# Patient Record
Sex: MALE | Race: WHITE | NOT HISPANIC OR LATINO | URBAN - METROPOLITAN AREA
[De-identification: names, ages, dates, MRNs, and addresses within clinical notes are randomized per-mention and may not be internally consistent; named-entity substitution may affect disease eponyms.]

---

## 2017-10-05 VITALS
HEART RATE: 78 BPM | HEIGHT: 68 IN | RESPIRATION RATE: 18 BRPM | TEMPERATURE: 97 F | DIASTOLIC BLOOD PRESSURE: 78 MMHG | SYSTOLIC BLOOD PRESSURE: 143 MMHG | WEIGHT: 205.03 LBS | OXYGEN SATURATION: 95 %

## 2017-10-05 NOTE — H&P ADULT - ASSESSMENT
77 y.o Male with PMHx of HTN, hyperlipidemia, NIDDM, Known CAD with prior PCIs, most recent ABBEY mRCA @  [facility] on 03/23/07  with patent prior mid and OM1 stent , 40% prox RCA, and 60% RPDA who presents today for recommended Cardiac Cath with possible intervention if clinically indicated secondary to CCS Anginal Class 3 Equivalent  symptoms in the setting of an abnormal Stress test.      ASA: III and Mallampati: III  ***OF NOTE:   Pt took her daily dose of ASA 325mg PO X 1 and Plavix 75mg PO X 1@ home today. 77 y.o Male with PMHx of HTN, hyperlipidemia, NIDDM, Known CAD with prior PCIs, most recent ABBEY mRCA @  [facility] on 03/23/07  with patent prior mid and OM1 stent , 40% prox RCA, and 60% RPDA who presents today for recommended Cardiac Cath with possible intervention if clinically indicated secondary to CCS Anginal Class 3 Equivalent  symptoms in the setting of an abnormal Stress test.      ASA: III and Mallampati: III  ***OF NOTE:   Pt received ASA 325mg PO X 1 and Plavix 75mg PO X 1 prior to his procedure today.

## 2017-10-05 NOTE — H&P ADULT - HISTORY OF PRESENT ILLNESS
77 y.o Male with PMHx of HTN, hyperlipidemia, NIDDM, who presented to his cardiologist c/o       Denies CP,  SOB, palpitations, dizziness, syncope, recent PND/orthopnea, LE edema, or decrease in exercise tolerance.        In light of pt's risk factors, above CCS Anginal Class _____ symptoms, and an abnormal Stress test, pt is now referred to [facility] for recommended Cardiac Cath with possible intervention if clinically indicated to  r/o suspected underlying CAD. 77 y.o Male with PMHx of HTN, hyperlipidemia, NIDDM, Known CAD with prior PCIs, most recent PCI @ [facility]  ~12yrs ago,   who presented to his cardiologist c/o       Denies CP,  SOB, palpitations, dizziness, syncope, recent PND/orthopnea, LE edema, or decrease in exercise tolerance.    Coronary CTA done on 11/18/16 revealed a total calcium score of         In light of pt's risk factors, above CCS Anginal Class _____ symptoms, and an abnormal Stress test, pt is now referred to [facility] for recommended Cardiac Cath with possible intervention if clinically indicated to  r/o suspected underlying CAD. 77 y.o Male with PMHx of HTN, hyperlipidemia, NIDDM, Known CAD with prior PCIs, most recent PCI @ [facility]  ~12yrs ago,   who presented to his cardiologist c/o   DO    Denies CP,  SOB, palpitations, dizziness, syncope, recent PND/orthopnea, LE edema, or decrease in exercise tolerance.    Coronary CTA done on 11/18/16 revealed a total calcium score of         In light of pt's risk factors, above CCS Anginal Class _____ symptoms, and an abnormal Stress test, pt is now referred to [facility] for recommended Cardiac Cath with possible intervention if clinically indicated to  r/o suspected underlying CAD. 77 y.o Male with PMHx of HTN, hyperlipidemia, NIDDM, Known CAD with prior PCIs, most recent PCI @ [facility]  ~12yrs ago,   who presented to his cardiologist Dr. Israel Gibson reporting a recent decline in his  over all ET.  Pt states he would consider himself to be a pretty active torin and first noted  he was feeling very "sluggish" and would easily getting SOB  while playing tennis.  He further states he can barely walk more than one city block or climb more than one  flight of stairs before he is lethargic and SOB.  He states he often finds the need to stop and "catch his breath". He denies experiencing any recent PND/orthopnea, LE edema, palpitations, dizziness, or  syncope.  He denies having any recent cardiac imaging studies done this year.  Coronary CTA done 11/18/16 revealed possible ISR of LAD and LCx/OM1 stents.  Mild stenosis of the LM secondary to non-calcific and calcific plaque.  Patent stent in the proximal and mid RCA and D2.  Limited evaluation of the mid RCA stent due to artifact, although probably patent.  Mild stenosis of the proximal and distal RCA.   Extra cardiac findings include a 9mm pleural- based nodule seen.  LVEF of 70%.   Further assessment with formal CT chest was recommended.      In light of pt's risk factors, Known CAD, above CCS Anginal Class 3 Equivalent symptoms, and an abnormal  Coronary CTA, pt is now referred to [facility] for recommended Cardiac Cath with possible intervention if clinically indicated to  r/o suspected CAD progression. 77 y.o Male with PMHx of HTN, hyperlipidemia, NIDDM, Known CAD with prior PCIs, most recent ABBEY mRCA # [facility] on 03/23/07  with patent prior mid and OM1 stent , 40% prox RCA, and 60% RPDA who presented to his cardiologist Dr. Israel Gibson reporting a recent decline in his  over all ET.  Pt states he would consider himself to be a pretty active torin and first noted  he was feeling very "sluggish" and would easily getting SOB  while playing tennis.  He further states he can barely walk more than one city block or climb more than one  flight of stairs before he is lethargic and SOB.  He states he often finds the need to stop and "catch his breath". He denies experiencing any recent PND/orthopnea, LE edema, palpitations, dizziness, or  syncope.  He denies having any recent cardiac imaging studies done this year.  Coronary CTA done 11/18/16 revealed possible ISR of LAD and LCx/OM1 stents.  Mild stenosis of the LM secondary to non-calcific and calcific plaque.  Patent stent in the proximal and mid RCA and D2.  Limited evaluation of the mid RCA stent due to artifact, although probably patent.  Mild stenosis of the proximal and distal RCA.   Extra cardiac findings include a 9mm pleural- based nodule seen.  LVEF of 70%.   Further assessment with formal CT chest was recommended.      In light of pt's risk factors, Known CAD, above CCS Anginal Class 3 Equivalent symptoms, and an abnormal  Coronary CTA, pt is now referred to [facility] for recommended Cardiac Cath with possible intervention if clinically indicated to  r/o suspected CAD progression. 77 y.o Male with PMHx of HTN, hyperlipidemia, NIDDM, Known CAD with prior PCIs, most recent ABBEY mRCA @  [facility] on 03/23/07  with patent prior mid and OM1 stent , 40% prox RCA, and 60% RPDA who presented to his cardiologist Dr. Israel Gibson reporting a recent decline in his  over all ET.  Pt states he would consider himself to be a pretty active torin and first noted  he was feeling very "sluggish" and would easily getting SOB  while playing tennis.  He further states he can barely walk more than one city block or climb more than one  flight of stairs before he is lethargic and SOB.  He states he often finds the need to stop and "catch his breath". He denies experiencing any recent PND/orthopnea, LE edema, palpitations, dizziness, or  syncope.  He denies having any recent cardiac imaging studies done this year.  Coronary CTA done 11/18/16 revealed possible ISR of LAD and LCx/OM1 stents.  Mild stenosis of the LM secondary to non-calcific and calcific plaque.  Patent stent in the proximal and mid RCA and D2.  Limited evaluation of the mid RCA stent due to artifact, although probably patent.  Mild stenosis of the proximal and distal RCA.   Extra cardiac findings include a 9mm pleural- based nodule seen.  LVEF of 70%.   Further assessment with formal CT chest was recommended.      In light of pt's risk factors, Known CAD, above CCS Anginal Class 3 Equivalent symptoms, and an abnormal  Coronary CTA, pt is now referred to [facility] for recommended Cardiac Cath with possible intervention if clinically indicated to  r/o suspected CAD progression.

## 2017-10-05 NOTE — H&P ADULT - PMH
Diabetes mellitus    Hyperlipidemia    Hypertension Coronary artery disease    Diabetes mellitus    Hyperlipidemia    Hypertension

## 2017-10-06 ENCOUNTER — INPATIENT (INPATIENT)
Facility: HOSPITAL | Age: 77
LOS: 0 days | Discharge: HOME | DRG: 247 | End: 2017-10-07
Attending: INTERNAL MEDICINE | Admitting: INTERNAL MEDICINE
Payer: MEDICARE

## 2017-10-06 DIAGNOSIS — Z90.49 ACQUIRED ABSENCE OF OTHER SPECIFIED PARTS OF DIGESTIVE TRACT: Chronic | ICD-10-CM

## 2017-10-06 LAB
ALBUMIN SERPL ELPH-MCNC: 4.7 G/DL — SIGNIFICANT CHANGE UP (ref 3.3–5)
ALP SERPL-CCNC: 57 U/L — SIGNIFICANT CHANGE UP (ref 40–120)
ALT FLD-CCNC: 18 U/L — SIGNIFICANT CHANGE UP (ref 10–45)
ANION GAP SERPL CALC-SCNC: 16 MMOL/L — SIGNIFICANT CHANGE UP (ref 5–17)
APTT BLD: 24.3 SEC — LOW (ref 27.5–37.4)
AST SERPL-CCNC: 17 U/L — SIGNIFICANT CHANGE UP (ref 10–40)
BASOPHILS NFR BLD AUTO: 1 % — SIGNIFICANT CHANGE UP (ref 0–2)
BILIRUB SERPL-MCNC: 0.6 MG/DL — SIGNIFICANT CHANGE UP (ref 0.2–1.2)
BUN SERPL-MCNC: 21 MG/DL — SIGNIFICANT CHANGE UP (ref 7–23)
CALCIUM SERPL-MCNC: 10.2 MG/DL — SIGNIFICANT CHANGE UP (ref 8.4–10.5)
CHLORIDE SERPL-SCNC: 101 MMOL/L — SIGNIFICANT CHANGE UP (ref 96–108)
CHOLEST SERPL-MCNC: 190 MG/DL — SIGNIFICANT CHANGE UP (ref 10–199)
CK MB CFR SERPL CALC: 2.7 NG/ML — SIGNIFICANT CHANGE UP (ref 0–6.7)
CO2 SERPL-SCNC: 24 MMOL/L — SIGNIFICANT CHANGE UP (ref 22–31)
CREAT SERPL-MCNC: 1.08 MG/DL — SIGNIFICANT CHANGE UP (ref 0.5–1.3)
CRP SERPL-MCNC: 0.6 MG/DL — HIGH (ref 0–0.4)
EOSINOPHIL NFR BLD AUTO: 3.7 % — SIGNIFICANT CHANGE UP (ref 0–6)
GLUCOSE SERPL-MCNC: 220 MG/DL — HIGH (ref 70–99)
HBA1C BLD-MCNC: 7.3 % — HIGH (ref 4–5.6)
HCT VFR BLD CALC: 40.5 % — SIGNIFICANT CHANGE UP (ref 39–50)
HDLC SERPL-MCNC: 61 MG/DL — SIGNIFICANT CHANGE UP (ref 40–125)
HGB BLD-MCNC: 13.7 G/DL — SIGNIFICANT CHANGE UP (ref 13–17)
INR BLD: 0.91 — SIGNIFICANT CHANGE UP (ref 0.88–1.16)
LIPID PNL WITH DIRECT LDL SERPL: 107 MG/DL — SIGNIFICANT CHANGE UP
LYMPHOCYTES # BLD AUTO: 27.5 % — SIGNIFICANT CHANGE UP (ref 13–44)
MCHC RBC-ENTMCNC: 29.1 PG — SIGNIFICANT CHANGE UP (ref 27–34)
MCHC RBC-ENTMCNC: 33.8 G/DL — SIGNIFICANT CHANGE UP (ref 32–36)
MCV RBC AUTO: 86 FL — SIGNIFICANT CHANGE UP (ref 80–100)
MONOCYTES NFR BLD AUTO: 6.9 % — SIGNIFICANT CHANGE UP (ref 2–14)
NEUTROPHILS NFR BLD AUTO: 60.9 % — SIGNIFICANT CHANGE UP (ref 43–77)
PLATELET # BLD AUTO: 166 K/UL — SIGNIFICANT CHANGE UP (ref 150–400)
POTASSIUM SERPL-MCNC: 4 MMOL/L — SIGNIFICANT CHANGE UP (ref 3.5–5.3)
POTASSIUM SERPL-SCNC: 4 MMOL/L — SIGNIFICANT CHANGE UP (ref 3.5–5.3)
PROT SERPL-MCNC: 7.7 G/DL — SIGNIFICANT CHANGE UP (ref 6–8.3)
PROTHROM AB SERPL-ACNC: 10.1 SEC — SIGNIFICANT CHANGE UP (ref 9.8–12.7)
RBC # BLD: 4.71 M/UL — SIGNIFICANT CHANGE UP (ref 4.2–5.8)
RBC # FLD: 13.8 % — SIGNIFICANT CHANGE UP (ref 10.3–16.9)
SODIUM SERPL-SCNC: 141 MMOL/L — SIGNIFICANT CHANGE UP (ref 135–145)
TOTAL CHOLESTEROL/HDL RATIO MEASUREMENT: 3.1 RATIO — LOW (ref 3.4–9.6)
TRIGL SERPL-MCNC: 110 MG/DL — SIGNIFICANT CHANGE UP (ref 10–149)
WBC # BLD: 8.2 K/UL — SIGNIFICANT CHANGE UP (ref 3.8–10.5)
WBC # FLD AUTO: 8.2 K/UL — SIGNIFICANT CHANGE UP (ref 3.8–10.5)

## 2017-10-06 PROCEDURE — 92928 PRQ TCAT PLMT NTRAC ST 1 LES: CPT | Mod: RC

## 2017-10-06 PROCEDURE — 93458 L HRT ARTERY/VENTRICLE ANGIO: CPT | Mod: 26,XU

## 2017-10-06 PROCEDURE — 93010 ELECTROCARDIOGRAM REPORT: CPT

## 2017-10-06 RX ORDER — SODIUM CHLORIDE 9 MG/ML
1000 INJECTION, SOLUTION INTRAVENOUS
Qty: 0 | Refills: 0 | Status: DISCONTINUED | OUTPATIENT
Start: 2017-10-06 | End: 2017-10-07

## 2017-10-06 RX ORDER — SODIUM CHLORIDE 9 MG/ML
500 INJECTION INTRAMUSCULAR; INTRAVENOUS; SUBCUTANEOUS
Qty: 0 | Refills: 0 | Status: DISCONTINUED | OUTPATIENT
Start: 2017-10-06 | End: 2017-10-06

## 2017-10-06 RX ORDER — INFLUENZA VIRUS VACCINE 15; 15; 15; 15 UG/.5ML; UG/.5ML; UG/.5ML; UG/.5ML
0.5 SUSPENSION INTRAMUSCULAR ONCE
Qty: 0 | Refills: 0 | Status: COMPLETED | OUTPATIENT
Start: 2017-10-06 | End: 2017-10-06

## 2017-10-06 RX ORDER — CLOPIDOGREL BISULFATE 75 MG/1
600 TABLET, FILM COATED ORAL ONCE
Qty: 0 | Refills: 0 | Status: COMPLETED | OUTPATIENT
Start: 2017-10-06 | End: 2017-10-06

## 2017-10-06 RX ORDER — DEXTROSE 50 % IN WATER 50 %
25 SYRINGE (ML) INTRAVENOUS ONCE
Qty: 0 | Refills: 0 | Status: DISCONTINUED | OUTPATIENT
Start: 2017-10-06 | End: 2017-10-07

## 2017-10-06 RX ORDER — ATORVASTATIN CALCIUM 80 MG/1
40 TABLET, FILM COATED ORAL AT BEDTIME
Qty: 0 | Refills: 0 | Status: DISCONTINUED | OUTPATIENT
Start: 2017-10-06 | End: 2017-10-07

## 2017-10-06 RX ORDER — CLOPIDOGREL BISULFATE 75 MG/1
75 TABLET, FILM COATED ORAL DAILY
Qty: 0 | Refills: 0 | Status: DISCONTINUED | OUTPATIENT
Start: 2017-10-07 | End: 2017-10-07

## 2017-10-06 RX ORDER — CHOLECALCIFEROL (VITAMIN D3) 125 MCG
1 CAPSULE ORAL
Qty: 0 | Refills: 0 | COMMUNITY

## 2017-10-06 RX ORDER — METFORMIN HYDROCHLORIDE 850 MG/1
1 TABLET ORAL
Qty: 0 | Refills: 0 | COMMUNITY

## 2017-10-06 RX ORDER — DEXTROSE 50 % IN WATER 50 %
12.5 SYRINGE (ML) INTRAVENOUS ONCE
Qty: 0 | Refills: 0 | Status: DISCONTINUED | OUTPATIENT
Start: 2017-10-06 | End: 2017-10-07

## 2017-10-06 RX ORDER — DEXTROSE 50 % IN WATER 50 %
1 SYRINGE (ML) INTRAVENOUS ONCE
Qty: 0 | Refills: 0 | Status: DISCONTINUED | OUTPATIENT
Start: 2017-10-06 | End: 2017-10-07

## 2017-10-06 RX ORDER — VALSARTAN 80 MG/1
160 TABLET ORAL DAILY
Qty: 0 | Refills: 0 | Status: DISCONTINUED | OUTPATIENT
Start: 2017-10-06 | End: 2017-10-07

## 2017-10-06 RX ORDER — INSULIN LISPRO 100/ML
VIAL (ML) SUBCUTANEOUS
Qty: 0 | Refills: 0 | Status: DISCONTINUED | OUTPATIENT
Start: 2017-10-06 | End: 2017-10-07

## 2017-10-06 RX ORDER — CHLORHEXIDINE GLUCONATE 213 G/1000ML
1 SOLUTION TOPICAL ONCE
Qty: 0 | Refills: 0 | Status: DISCONTINUED | OUTPATIENT
Start: 2017-10-06 | End: 2017-10-06

## 2017-10-06 RX ORDER — CARVEDILOL PHOSPHATE 80 MG/1
3.12 CAPSULE, EXTENDED RELEASE ORAL EVERY 12 HOURS
Qty: 0 | Refills: 0 | Status: DISCONTINUED | OUTPATIENT
Start: 2017-10-06 | End: 2017-10-07

## 2017-10-06 RX ORDER — GLUCAGON INJECTION, SOLUTION 0.5 MG/.1ML
1 INJECTION, SOLUTION SUBCUTANEOUS ONCE
Qty: 0 | Refills: 0 | Status: DISCONTINUED | OUTPATIENT
Start: 2017-10-06 | End: 2017-10-07

## 2017-10-06 RX ORDER — HYDROCHLOROTHIAZIDE 25 MG
12.5 TABLET ORAL DAILY
Qty: 0 | Refills: 0 | Status: DISCONTINUED | OUTPATIENT
Start: 2017-10-06 | End: 2017-10-07

## 2017-10-06 RX ORDER — UBIDECARENONE 100 MG
1 CAPSULE ORAL
Qty: 0 | Refills: 0 | COMMUNITY

## 2017-10-06 RX ORDER — ASPIRIN/CALCIUM CARB/MAGNESIUM 324 MG
81 TABLET ORAL DAILY
Qty: 0 | Refills: 0 | Status: DISCONTINUED | OUTPATIENT
Start: 2017-10-07 | End: 2017-10-07

## 2017-10-06 RX ORDER — ASPIRIN/CALCIUM CARB/MAGNESIUM 324 MG
325 TABLET ORAL ONCE
Qty: 0 | Refills: 0 | Status: COMPLETED | OUTPATIENT
Start: 2017-10-06 | End: 2017-10-06

## 2017-10-06 RX ADMIN — Medication 325 MILLIGRAM(S): at 09:11

## 2017-10-06 RX ADMIN — Medication 4: at 15:11

## 2017-10-06 RX ADMIN — CARVEDILOL PHOSPHATE 3.12 MILLIGRAM(S): 80 CAPSULE, EXTENDED RELEASE ORAL at 17:53

## 2017-10-06 RX ADMIN — ATORVASTATIN CALCIUM 40 MILLIGRAM(S): 80 TABLET, FILM COATED ORAL at 22:35

## 2017-10-06 RX ADMIN — VALSARTAN 160 MILLIGRAM(S): 80 TABLET ORAL at 15:10

## 2017-10-06 RX ADMIN — CLOPIDOGREL BISULFATE 600 MILLIGRAM(S): 75 TABLET, FILM COATED ORAL at 09:12

## 2017-10-06 NOTE — CONSULT NOTE ADULT - SUBJECTIVE AND OBJECTIVE BOX
CHIEF COMPLAINT: CAD    HISTORY OF PRESENT ILLNESS:  77 y.o Male with PMHx of HTN, hyperlipidemia, NIDDM, Known CAD with prior PCIs, most recent ABBEY mRCA @  [facility] on 03/23/07  with patent prior mid and OM1 stent , 40% prox RCA, and 60% RPDA who presented to his cardiologist Dr. Israel Gibson reporting a recent decline in his  over all ET.  Pt states he would consider himself to be a pretty active torin and first noted  he was feeling very "sluggish" and would easily getting SOB  while playing tennis.  He further states he can barely walk more than one city block or climb more than one  flight of stairs before he is lethargic and SOB.  He states he often finds the need to stop and "catch his breath". He denies experiencing any recent PND/orthopnea, LE edema, palpitations, dizziness, or  syncope.  He denies having any recent cardiac imaging studies done this year.  Coronary CTA done 11/18/16 revealed possible ISR of LAD and LCx/OM1 stents.  Mild stenosis of the LM secondary to non-calcific and calcific plaque.  Patent stent in the proximal and mid RCA and D2.  Limited evaluation of the mid RCA stent due to artifact, although probably patent.  Mild stenosis of the proximal and distal RCA.   Extra cardiac findings include a 9mm pleural- based nodule seen.  LVEF of 70%.   Further assessment with formal CT chest was recommended. Patient is now s/p cardiac cath 10/6: IVUS/PTCA/ABBEY-->RPL, ABBEY-->pRCA, PTCA-->mRCA ISR. Patent LAD/LCx stents. EF:normal, EDP 10mmHg. He was seen for a prevention consultation.     PAST MEDICAL & SURGICAL HISTORY:  Coronary artery disease  Diabetes mellitus  Hyperlipidemia  Hypertension  History of appendectomy    FAMILY HISTORY:   His father had a CABG in his 40's. His uncles also had CAD.     Allergies:   No Known Allergies      HOME MEDICATIONS:  · 	Ecotrin Adult Low Strength 81 mg oral delayed release tablet: 1 tab(s) orally once a day, Last Dose Taken:    · 	Plavix 75 mg oral tablet: 1 tab(s) orally once a day, Last Dose Taken:    · 	Coreg 3.125 mg oral tablet: 1 tab(s) orally 2 times a day, Last Dose Taken:    · 	Diovan  mg-12.5 mg oral tablet: 1 tab(s) orally once a day, Last Dose Taken:    · 	Crestor 10 mg oral tablet: 1 tab(s) orally once a day (at bedtime), Last Dose Taken:  **Patient reports non-adherence x 6 weeks**  · 	metFORMIN 500 mg oral tablet: 1 tab(s) orally 2 times a day, Last Dose Taken:    · 	CoQ10 300 mg oral capsule: 1 cap(s) orally once a day, Last Dose Taken:    · 	Multiple Vitamins oral tablet: 1 tab(s) orally once a day  · 	Vitamin D3 1000 intl units oral tablet: 1 tab(s) orally once a day    	    PHYSICAL EXAM:  T(C): 36.3 (10-06-17 @ 14:28), Max: 36.3 (10-06-17 @ 14:28)  T(F): 97.3 (10-06-17 @ 14:28), Max: 97.3 (10-06-17 @ 14:28)  HR: 80 (10-06-17 @ 17:53) (74 - 80)  BP: 178/87 (10-06-17 @ 17:53) (157/89 - 178/87)  RR: 16 (10-06-17 @ 17:53) (16 - 16)  SpO2: 98% (10-06-17 @ 17:53) (97% - 98%)  Height (cm): 172.72 (10-06 @ 08:46)  Weight (kg): 93 (10-06 @ 08:46)  BMI (kg/m2): 31.2 (10-06 @ 08:46)  	  LABS:	                        13.7   8.2   )-----------( 166      ( 06 Oct 2017 08:02 )             40.5     10-06    141  |  101  |  21  ----------------------------<  220<H>  4.0   |  24  |  1.08    Ca    10.2      06 Oct 2017 08:02    TPro  7.7  /  Alb  4.7  /  TBili  0.6  /  DBili  x   /  AST  17  /  ALT  18  /  AlkPhos  57  10-06      Hemoglobin A1C, Whole Blood: 7.3 % (10-06 @ 08:02)      Cholesterol, Serum: 190 mg/dL (10-06 @ 08:02)  HDL Cholesterol, Serum: 61 mg/dL (10-06 @ 08:02)  Triglycerides, Serum: 110 mg/dL (10-06 @ 08:02)  Direct LDL: 107 mg/dL (10-06 @ 08:02)    C-Reactive Protein, Serum: 0.60 mg/dL (10-06 @ 08:02)      10 "S" ASSESSMENT PLAN: SMOKING, SITTING, SUGAR, SALT, SOME FATS, SOCIAL, SLEEP, SIGNS, AND MEDS:  Tobacco usage: He smoked from [age], about 1 ppd.   Stress: Rates his stress as very low.   ETOH: 1-2 servings per night.   Caffeine: He has about 3-5 cups of black coffee per day.   Hormone Replacement: Denies.   Sleep Disorder: + snoring at times, he says he wakes well-rested.   Inflammatory Condition: Denies.   Activity Level: He plays tennis twice weekly and does 2.5 miles/wk on the treadmill and on other days he will walk 1-2 miles.   Current Diet: Breakfast) cereal or conti and eggs. Lunch) small personal pizza, a hot dog, a sandwich on white bread with salami and American cheese. Dinner) fish with vegetables or pasta or the same food he eats at lunch. Snacks) chocolate or cheese and crackers or popcorn.   Heart Failure: Denies.   Myopathy with Statins: Denies.   GI/ Issues: occasional acid reflux with acidic food consumption.     ASSESSMENT/RECOMMENDATIONS: 	  Summary: 77 y.o Male with PMHx of HTN, hyperlipidemia, NIDDM, Known CAD with prior PCIs, most recent ABBEY mRCA @  [facility] on 03/23/07  with patent prior mid and OM1 stent , 40% prox RCA, and 60% RPDA who presented to his cardiologist Dr. Israel Gibson reporting a recent decline in his  over all ET.  Pt states he would consider himself to be a pretty active torin and first noted  he was feeling very "sluggish" and would easily getting SOB  while playing tennis.  He further states he can barely walk more than one city block or climb more than one  flight of stairs before he is lethargic and SOB.  He states he often finds the need to stop and "catch his breath". He denies experiencing any recent PND/orthopnea, LE edema, palpitations, dizziness, or  syncope.  He denies having any recent cardiac imaging studies done this year.  Coronary CTA done 11/18/16 revealed possible ISR of LAD and LCx/OM1 stents.  Mild stenosis of the LM secondary to non-calcific and calcific plaque.  Patent stent in the proximal and mid RCA and D2.  Limited evaluation of the mid RCA stent due to artifact, although probably patent.  Mild stenosis of the proximal and distal RCA.   Extra cardiac findings include a 9mm pleural- based nodule seen.  LVEF of 70%.   Further assessment with formal CT chest was recommended. Patient is now s/p cardiac cath 10/6: IVUS/PTCA/ABBEY-->RPL, ABBEY-->pRCA, PTCA-->mRCA ISR. Patent LAD/LCx stents. EF:normal, EDP 10mmHg. He was seen for a prevention consultation.     RECOMMENDATIONS:   Anti-platelet Therapy: DAPT per interventionalist recommendation with asa/Plavix.   Lipid Therapy: Patient admits to non-adherence to statin therapy for 6 weeks. Crestor was increased to 20mg/d.   Beta Blocker Therapy: Continue current therapy with Coreg per your discretion.   ACE/ARB Therapy: Continue current therapy with Diovan per your discretion.   Diet: Low-sodium, low-carbohydrate, Mediterranean diet. Written instruction on the Mediterranean diet was provided. We discussed eliminating all processed meats from his diet. He will also limit sweets in his diet. He has intolerance to doses of metformin over 500mg/d. He would likely benefit from the addition of a GLP-1 such as Victoza to help bring down his A1C and reduce his weight.   Exercise: 30-45 minutes most days of the week once cleared to do so by their referring cardiologist.   Smoking: This patient is a non-smoker.   Stress Management: Instruction on mindfulness meditation was provided.   Sleep: A sleep study might benefit this patient.     Thank you for the opportunity to see this patient. Please feel free to contact [facility] if there are any questions, or if you feel that your patient would benefit from continued follow-up visits with the Program.

## 2017-10-07 ENCOUNTER — TRANSCRIPTION ENCOUNTER (OUTPATIENT)
Age: 77
End: 2017-10-07

## 2017-10-07 VITALS — TEMPERATURE: 97 F

## 2017-10-07 LAB
ANION GAP SERPL CALC-SCNC: 14 MMOL/L — SIGNIFICANT CHANGE UP (ref 5–17)
BUN SERPL-MCNC: 14 MG/DL — SIGNIFICANT CHANGE UP (ref 7–23)
CALCIUM SERPL-MCNC: 9.8 MG/DL — SIGNIFICANT CHANGE UP (ref 8.4–10.5)
CHLORIDE SERPL-SCNC: 99 MMOL/L — SIGNIFICANT CHANGE UP (ref 96–108)
CO2 SERPL-SCNC: 25 MMOL/L — SIGNIFICANT CHANGE UP (ref 22–31)
CREAT SERPL-MCNC: 1.03 MG/DL — SIGNIFICANT CHANGE UP (ref 0.5–1.3)
GLUCOSE SERPL-MCNC: 207 MG/DL — HIGH (ref 70–99)
HCT VFR BLD CALC: 42 % — SIGNIFICANT CHANGE UP (ref 39–50)
HGB BLD-MCNC: 13.7 G/DL — SIGNIFICANT CHANGE UP (ref 13–17)
MAGNESIUM SERPL-MCNC: 1.8 MG/DL — SIGNIFICANT CHANGE UP (ref 1.6–2.6)
MCHC RBC-ENTMCNC: 28.2 PG — SIGNIFICANT CHANGE UP (ref 27–34)
MCHC RBC-ENTMCNC: 32.6 G/DL — SIGNIFICANT CHANGE UP (ref 32–36)
MCV RBC AUTO: 86.4 FL — SIGNIFICANT CHANGE UP (ref 80–100)
PLATELET # BLD AUTO: 156 K/UL — SIGNIFICANT CHANGE UP (ref 150–400)
POTASSIUM SERPL-MCNC: 4 MMOL/L — SIGNIFICANT CHANGE UP (ref 3.5–5.3)
POTASSIUM SERPL-SCNC: 4 MMOL/L — SIGNIFICANT CHANGE UP (ref 3.5–5.3)
RBC # BLD: 4.86 M/UL — SIGNIFICANT CHANGE UP (ref 4.2–5.8)
RBC # FLD: 13.6 % — SIGNIFICANT CHANGE UP (ref 10.3–16.9)
SODIUM SERPL-SCNC: 138 MMOL/L — SIGNIFICANT CHANGE UP (ref 135–145)
WBC # BLD: 7 K/UL — SIGNIFICANT CHANGE UP (ref 3.8–10.5)
WBC # FLD AUTO: 7 K/UL — SIGNIFICANT CHANGE UP (ref 3.8–10.5)

## 2017-10-07 PROCEDURE — 93010 ELECTROCARDIOGRAM REPORT: CPT

## 2017-10-07 PROCEDURE — 99239 HOSP IP/OBS DSCHRG MGMT >30: CPT

## 2017-10-07 RX ORDER — CARVEDILOL PHOSPHATE 80 MG/1
1 CAPSULE, EXTENDED RELEASE ORAL
Qty: 60 | Refills: 2 | OUTPATIENT
Start: 2017-10-07 | End: 2018-01-04

## 2017-10-07 RX ORDER — CLOPIDOGREL BISULFATE 75 MG/1
1 TABLET, FILM COATED ORAL
Qty: 30 | Refills: 11 | OUTPATIENT
Start: 2017-10-07 | End: 2018-10-01

## 2017-10-07 RX ORDER — CLOPIDOGREL BISULFATE 75 MG/1
1 TABLET, FILM COATED ORAL
Qty: 0 | Refills: 0 | COMMUNITY

## 2017-10-07 RX ORDER — ASPIRIN/CALCIUM CARB/MAGNESIUM 324 MG
1 TABLET ORAL
Qty: 30 | Refills: 11 | OUTPATIENT
Start: 2017-10-07 | End: 2018-10-01

## 2017-10-07 RX ORDER — CLOPIDOGREL BISULFATE 75 MG/1
1 TABLET, FILM COATED ORAL
Qty: 6 | Refills: 0 | OUTPATIENT
Start: 2017-10-07 | End: 2017-10-13

## 2017-10-07 RX ORDER — MAGNESIUM OXIDE 400 MG ORAL TABLET 241.3 MG
400 TABLET ORAL ONCE
Qty: 0 | Refills: 0 | Status: COMPLETED | OUTPATIENT
Start: 2017-10-07 | End: 2017-10-07

## 2017-10-07 RX ORDER — CARVEDILOL PHOSPHATE 80 MG/1
1 CAPSULE, EXTENDED RELEASE ORAL
Qty: 0 | Refills: 0 | COMMUNITY

## 2017-10-07 RX ORDER — ASPIRIN/CALCIUM CARB/MAGNESIUM 324 MG
1 TABLET ORAL
Qty: 0 | Refills: 0 | COMMUNITY

## 2017-10-07 RX ORDER — CARVEDILOL PHOSPHATE 80 MG/1
3.12 CAPSULE, EXTENDED RELEASE ORAL ONCE
Qty: 0 | Refills: 0 | Status: DISCONTINUED | OUTPATIENT
Start: 2017-10-07 | End: 2017-10-07

## 2017-10-07 RX ORDER — ROSUVASTATIN CALCIUM 5 MG/1
1 TABLET ORAL
Qty: 30 | Refills: 3 | OUTPATIENT
Start: 2017-10-07 | End: 2018-02-03

## 2017-10-07 RX ORDER — ROSUVASTATIN CALCIUM 5 MG/1
1 TABLET ORAL
Qty: 0 | Refills: 0 | COMMUNITY

## 2017-10-07 RX ADMIN — VALSARTAN 160 MILLIGRAM(S): 80 TABLET ORAL at 11:54

## 2017-10-07 RX ADMIN — CARVEDILOL PHOSPHATE 3.12 MILLIGRAM(S): 80 CAPSULE, EXTENDED RELEASE ORAL at 06:31

## 2017-10-07 RX ADMIN — Medication 1 TABLET(S): at 11:54

## 2017-10-07 RX ADMIN — Medication 8: at 11:55

## 2017-10-07 RX ADMIN — Medication 12.5 MILLIGRAM(S): at 06:31

## 2017-10-07 RX ADMIN — Medication 81 MILLIGRAM(S): at 11:54

## 2017-10-07 RX ADMIN — MAGNESIUM OXIDE 400 MG ORAL TABLET 400 MILLIGRAM(S): 241.3 TABLET ORAL at 11:54

## 2017-10-07 RX ADMIN — CLOPIDOGREL BISULFATE 75 MILLIGRAM(S): 75 TABLET, FILM COATED ORAL at 11:55

## 2017-10-07 NOTE — DISCHARGE NOTE ADULT - CARE PLAN
Principal Discharge DX:	Coronary artery disease  Goal:	please take your aspirin and plavix regularly and follow-up with Dr. Owens  Instructions for follow-up, activity and diet:	You have a diagnosis of coronary artery disease and  received a stent to your coronary artery.  You have been started on Aspirin 81mg daily and Plavix (Clopidogrel) 75mg Daily. You MUST continue taking the daily Aspirin and Plavix to ensure your stent does not close. DO NOT STOP THESE MEDICATIONS FOR ANY REASON UNLESS OTHERWISE INDICATED BY YOUR CARDIOLOGIST BECAUSE THIS WILL PUT YOU AT RISK FOR A HEART ATTACK. You should refrain from strenuous activity and heavy lifting for 1 week. Please make a follow up appointment with your cardiologist within 1-2 weeks of your discharge. All of your prescriptions have been sent electronically to your pharmacy. You may shower, but do not take a bath or swim for 7 days. If you have any bleeding or hematoma (hardened blood collection under the skin) please hold the pressure on the site of the access of the right groin, please follo  Secondary Diagnosis:	Diabetes mellitus  Secondary Diagnosis:	Hyperlipidemia  Secondary Diagnosis:	Hypertension Principal Discharge DX:	Coronary artery disease  Goal:	please take your aspirin and plavix regularly and follow-up with Dr. Owens  Instructions for follow-up, activity and diet:	You have a diagnosis of coronary artery disease and  received a stent to your coronary artery.  You have been started on Aspirin 81mg daily and Plavix (Clopidogrel) 75mg Daily. You MUST continue taking the daily Aspirin and Plavix to ensure your stent does not close. DO NOT STOP THESE MEDICATIONS FOR ANY REASON UNLESS OTHERWISE INDICATED BY YOUR CARDIOLOGIST BECAUSE THIS WILL PUT YOU AT RISK FOR A HEART ATTACK. You should refrain from strenuous activity and heavy lifting for 1 week. Please make a follow up appointment with your cardiologist within 1-2 weeks of your discharge. All of your prescriptions have been sent electronically to your pharmacy. You may shower, but do not take a bath or swim for 7 days. If you have any bleeding or hematoma (hardened blood collection under the skin) please hold the pressure on the site of the access of the right groin, please go to the nearest emergency room.  Secondary Diagnosis:	Diabetes mellitus  Goal:	continue taking your Metformin and follow-up with PMD  Instructions for follow-up, activity and diet:	You have a diagnosis of diabetes and should continue all of your diabetic medications as prescribed. Please do not take your metformin for 2 days to prevent interaction with the contrast you recieved.  Secondary Diagnosis:	Hyperlipidemia  Goal:	start taking crestor 20 and follow-up with your PMD  Instructions for follow-up, activity and diet:	Please continue your daily statin to ensure your cardiac stent remains open. We increased the dose of crestor to 20 mg daily it was e-prescribed to your preferred pharmacy. Please check your liver function test in one month and follow-up with your PMD.  Secondary Diagnosis:	Hypertension  Goal:	please take carvedilol 6.25 mg twice a day  Instructions for follow-up, activity and diet:	You have a history of elevated blood pressure and you should continue your blood pressure medications as prescribed. We increased your carvedilol dose to 6.25 twice a day. it was e-prescribed to your preferred pharmacy. Please check your blood pressure regularly and follow-up with your PMD.

## 2017-10-07 NOTE — DISCHARGE NOTE ADULT - CARE PROVIDER_API CALL
Leon Champagne (CHRISTINA), Internal Medicine  [address]  [place] [address]  Phone: [phone]  Fax: [phone]

## 2017-10-07 NOTE — DISCHARGE NOTE ADULT - HOSPITAL COURSE
77 y.o Male with PMHx of HTN, hyperlipidemia, NIDDM, Known CAD with prior PCIs, most recent ABBEY mRCA @  [facility] on 03/23/07  with patent prior mid and OM1 stent , 40% prox RCA, and 60% RPDA who presented to his cardiologist Dr. Israel Gibson reporting a recent decline in his  over all ET.  Pt states he would consider himself to be a pretty active torin and first noted  he was feeling very "sluggish" and would easily getting SOB  while playing tennis.  He further states he can barely walk more than one city block or climb more than one  flight of stairs before he is lethargic and SOB.  He states he often finds the need to stop and "catch his breath". He denies experiencing any recent PND/orthopnea, LE edema, palpitations, dizziness, or  syncope.  He denies having any recent cardiac imaging studies done this year.  Coronary CTA done 11/18/16 revealed possible ISR of LAD and LCx/OM1 stents.  Mild stenosis of the LM secondary to non-calcific and calcific plaque.  Patent stent in the proximal and mid RCA and D2.  Limited evaluation of the mid RCA stent due to artifact, although probably patent.  Mild stenosis of the proximal and distal RCA.   Extra cardiac findings include a 9mm pleural- based nodule seen.  LVEF of 70%.   Further assessment with formal CT chest was recommended.  In light of pt's risk factors, Known CAD, above CCS Anginal Class 3 Equivalent symptoms, and an abnormal  Coronary CTA, pt was referred to [facility] for recommended Cardiac Cath with possible intervention if clinically indicated to  r/o suspected CAD progression.   Pt is s/p IVUS/PTCA/ABBEY to RPL, ABBEY pRCA, PTCA of mRCA ISR. Patent LAD/LCx stents with normal EF, EDP 10, right radial access. Pt was examined in am. VSS stable, physical exam is normal, no events on tele overnight. Pt's right radial access site is normal, no hematoma, no bleeding. Pt had elevated blood pressure, we increased the dose of carvedilol to 6.25 bid. We increased the dose of crestor to 20 mg as well. ASA/plavix/crestor/ carvedilol medications were e-prescribed to pt's preferred pharmacy. Pt will follow-up with Dr. Champagne within 1-2 weeks. Pt is stable for the discharge as per Dr. Vasquez. Proper discharge instructions were given regarding pt;s follow-up, medical management and wound care. 77 y.o Male with PMHx of HTN, hyperlipidemia, NIDDM, Known CAD with prior PCIs, most recent ABBEY mRCA @  [facility] on 03/23/07  with patent prior mid and OM1 stent , 40% prox RCA, and 60% RPDA who presented to his cardiologist Dr. Israel Gibson reporting a recent decline in his  over all ET.  Pt states he would consider himself to be a pretty active torin and first noted  he was feeling very "sluggish" and would easily getting SOB  while playing tennis.  He further states he can barely walk more than one city block or climb more than one  flight of stairs before he is lethargic and SOB.  He states he often finds the need to stop and "catch his breath". He denies experiencing any recent PND/orthopnea, LE edema, palpitations, dizziness, or  syncope.  He denies having any recent cardiac imaging studies done this year.  Coronary CTA done 11/18/16 revealed possible ISR of LAD and LCx/OM1 stents.  Mild stenosis of the LM secondary to non-calcific and calcific plaque.  Patent stent in the proximal and mid RCA and D2.  Limited evaluation of the mid RCA stent due to artifact, although probably patent.  Mild stenosis of the proximal and distal RCA.   Extra cardiac findings include a 9mm pleural- based nodule seen.  LVEF of 70%.   Further assessment with formal CT chest was recommended.  In light of pt's risk factors, Known CAD, above CCS Anginal Class 3 Equivalent symptoms, and an abnormal  Coronary CTA, pt was referred to [facility] for recommended Cardiac Cath with possible intervention if clinically indicated to  r/o suspected CAD progression.   Pt is s/p IVUS/PTCA/ABBEY to RPL, ABBEY pRCA, PTCA of mRCA ISR. Patent LAD/LCx stents with normal EF, EDP 10, right radial access. Pt was examined in am. VSS stable, physical exam is normal, no events on tele overnight. Pt's right radial access site is normal, no hematoma, no bleeding. Pt had elevated blood pressure, we increased the dose of carvedilol to 6.25 bid. We increased the dose of crestor to 20 mg as well. ASA/plavix/crestor/ carvedilol medications were e-prescribed to pt's preferred pharmacy. Pt will follow-up with Dr. Champagne within 1-2 weeks. Pt is stable for the discharge as per Dr. Vasquez. Proper discharge instructions were given regarding pt;s follow-up, medical management and wound care.  Of note: pt requested to send 5 days supply to [facility] pharmacy at [phone]

## 2017-10-07 NOTE — DISCHARGE NOTE ADULT - PLAN OF CARE
please take your aspirin and plavix regularly and follow-up with Dr. Owens You have a diagnosis of coronary artery disease and  received a stent to your coronary artery.  You have been started on Aspirin 81mg daily and Plavix (Clopidogrel) 75mg Daily. You MUST continue taking the daily Aspirin and Plavix to ensure your stent does not close. DO NOT STOP THESE MEDICATIONS FOR ANY REASON UNLESS OTHERWISE INDICATED BY YOUR CARDIOLOGIST BECAUSE THIS WILL PUT YOU AT RISK FOR A HEART ATTACK. You should refrain from strenuous activity and heavy lifting for 1 week. Please make a follow up appointment with your cardiologist within 1-2 weeks of your discharge. All of your prescriptions have been sent electronically to your pharmacy. You may shower, but do not take a bath or swim for 7 days. If you have any bleeding or hematoma (hardened blood collection under the skin) please hold the pressure on the site of the access of the right groin, please follo You have a diagnosis of coronary artery disease and  received a stent to your coronary artery.  You have been started on Aspirin 81mg daily and Plavix (Clopidogrel) 75mg Daily. You MUST continue taking the daily Aspirin and Plavix to ensure your stent does not close. DO NOT STOP THESE MEDICATIONS FOR ANY REASON UNLESS OTHERWISE INDICATED BY YOUR CARDIOLOGIST BECAUSE THIS WILL PUT YOU AT RISK FOR A HEART ATTACK. You should refrain from strenuous activity and heavy lifting for 1 week. Please make a follow up appointment with your cardiologist within 1-2 weeks of your discharge. All of your prescriptions have been sent electronically to your pharmacy. You may shower, but do not take a bath or swim for 7 days. If you have any bleeding or hematoma (hardened blood collection under the skin) please hold the pressure on the site of the access of the right groin, please go to the nearest emergency room. continue taking your Metformin and follow-up with PMD You have a diagnosis of diabetes and should continue all of your diabetic medications as prescribed. Please do not take your metformin for 2 days to prevent interaction with the contrast you recieved. start taking crestor 20 and follow-up with your PMD Please continue your daily statin to ensure your cardiac stent remains open. We increased the dose of crestor to 20 mg daily it was e-prescribed to your preferred pharmacy. Please check your liver function test in one month and follow-up with your PMD. please take carvedilol 6.25 mg twice a day You have a history of elevated blood pressure and you should continue your blood pressure medications as prescribed. We increased your carvedilol dose to 6.25 twice a day. it was e-prescribed to your preferred pharmacy. Please check your blood pressure regularly and follow-up with your PMD.

## 2017-10-07 NOTE — DISCHARGE NOTE ADULT - MEDICATION SUMMARY - MEDICATIONS TO TAKE
I will START or STAY ON the medications listed below when I get home from the hospital:    Ecotrin Adult Low Strength 81 mg oral delayed release tablet  -- 1 tab(s) by mouth once a day   -- Swallow whole.  Do not crush.  Take with food or milk.    -- Indication: For keeps the stent open, do not stop taking it without consulting your doctor    metFORMIN 500 mg oral tablet  -- 1 tab(s) by mouth 2 times a day  -- Indication: For for diabetes    Crestor 20 mg oral tablet  -- 1 tab(s) by mouth once a day (at bedtime)   -- Do not take dairy products, antacids, or iron preparations within one hour of this medication.  Do not take this drug if you are pregnant.  It is very important that you take or use this exactly as directed.  Do not skip doses or discontinue unless directed by your doctor.    -- Indication: For for high cholesterol, we increased the dose to 20 mg    Diovan  mg-12.5 mg oral tablet  -- 1 tab(s) by mouth once a day  -- Indication: For for high blood pressure    Plavix 75 mg oral tablet  -- 1 tab(s) by mouth once a day   -- Do not take aspirin or aspirin containing products without knowledge and consent of your physician.    -- Indication: For keeps the stent open, do not stop taking it without consulting your doctor    carvedilol 6.25 mg oral tablet  -- 1 tab(s) by mouth 2 times a day   -- It is very important that you take or use this exactly as directed.  Do not skip doses or discontinue unless directed by your doctor.  May cause drowsiness.  Alcohol may intensify this effect.  Use care when operating dangerous machinery.  Some non-prescription drugs may aggravate your condition.  Read all labels carefully.  If a warning appears, check with your doctor before taking.  Take with food or milk.    -- Indication: For for high blood pressure, we increased the dose to 6.25 mg daily    CoQ10 300 mg oral capsule  -- 1 cap(s) by mouth once a day  -- Indication: For as prescribed    Multiple Vitamins oral tablet  -- 1 tab(s) by mouth once a day  -- Indication: For as prescribed    Vitamin D3 1000 intl units oral tablet  -- 1 tab(s) by mouth once a day  -- Indication: For as prescribed

## 2017-10-07 NOTE — DISCHARGE NOTE ADULT - MEDICATION SUMMARY - MEDICATIONS TO STOP TAKING
I will STOP taking the medications listed below when I get home from the hospital:    Coreg 3.125 mg oral tablet  -- 1 tab(s) by mouth 2 times a day    Crestor 10 mg oral tablet  -- 1 tab(s) by mouth once a day (at bedtime)

## 2017-10-07 NOTE — DISCHARGE NOTE ADULT - PATIENT PORTAL LINK FT
“You can access the [facility] Patient Portal, offered by [facility], by registering with the following website: [email or web address]”

## 2017-10-12 DIAGNOSIS — E78.5 HYPERLIPIDEMIA, UNSPECIFIED: ICD-10-CM

## 2017-10-12 DIAGNOSIS — I10 ESSENTIAL (PRIMARY) HYPERTENSION: ICD-10-CM

## 2017-10-12 DIAGNOSIS — Z87.891 PERSONAL HISTORY OF NICOTINE DEPENDENCE: ICD-10-CM

## 2017-10-12 DIAGNOSIS — Z90.49 ACQUIRED ABSENCE OF OTHER SPECIFIED PARTS OF DIGESTIVE TRACT: ICD-10-CM

## 2017-10-12 DIAGNOSIS — Z23 ENCOUNTER FOR IMMUNIZATION: ICD-10-CM

## 2017-10-12 DIAGNOSIS — I25.119 ATHEROSCLEROTIC HEART DISEASE OF NATIVE CORONARY ARTERY WITH UNSPECIFIED ANGINA PECTORIS: ICD-10-CM

## 2017-10-12 DIAGNOSIS — Z79.84 LONG TERM (CURRENT) USE OF ORAL HYPOGLYCEMIC DRUGS: ICD-10-CM

## 2017-10-12 DIAGNOSIS — Z79.82 LONG TERM (CURRENT) USE OF ASPIRIN: ICD-10-CM

## 2017-10-12 DIAGNOSIS — T82.855A STENOSIS OF CORONARY ARTERY STENT, INITIAL ENCOUNTER: ICD-10-CM

## 2017-10-12 DIAGNOSIS — Z79.899 OTHER LONG TERM (CURRENT) DRUG THERAPY: ICD-10-CM
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
## 2017-10-12 DIAGNOSIS — R91.1 SOLITARY PULMONARY NODULE: ICD-10-CM

## 2017-10-12 DIAGNOSIS — Z79.02 LONG TERM (CURRENT) USE OF ANTITHROMBOTICS/ANTIPLATELETS: ICD-10-CM

## 2017-10-12 DIAGNOSIS — E11.9 TYPE 2 DIABETES MELLITUS WITHOUT COMPLICATIONS: ICD-10-CM
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
## 2017-10-12 DIAGNOSIS — Z95.5 PRESENCE OF CORONARY ANGIOPLASTY IMPLANT AND GRAFT: ICD-10-CM

## 2017-10-12 DIAGNOSIS — I25.110 ATHEROSCLEROTIC HEART DISEASE OF NATIVE CORONARY ARTERY WITH UNSTABLE ANGINA PECTORIS: ICD-10-CM

## 2017-12-19 PROCEDURE — 82550 ASSAY OF CK (CPK): CPT

## 2017-12-19 PROCEDURE — C1887: CPT

## 2017-12-19 PROCEDURE — 86140 C-REACTIVE PROTEIN: CPT

## 2017-12-19 PROCEDURE — 85027 COMPLETE CBC AUTOMATED: CPT

## 2017-12-19 PROCEDURE — C1725: CPT

## 2017-12-19 PROCEDURE — 85610 PROTHROMBIN TIME: CPT

## 2017-12-19 PROCEDURE — 80061 LIPID PANEL: CPT

## 2017-12-19 PROCEDURE — C1874: CPT

## 2017-12-19 PROCEDURE — 82553 CREATINE MB FRACTION: CPT

## 2017-12-19 PROCEDURE — 36415 COLL VENOUS BLD VENIPUNCTURE: CPT

## 2017-12-19 PROCEDURE — 83735 ASSAY OF MAGNESIUM: CPT

## 2017-12-19 PROCEDURE — C1769: CPT

## 2017-12-19 PROCEDURE — 80053 COMPREHEN METABOLIC PANEL: CPT

## 2017-12-19 PROCEDURE — 85025 COMPLETE CBC W/AUTO DIFF WBC: CPT

## 2017-12-19 PROCEDURE — 80048 BASIC METABOLIC PNL TOTAL CA: CPT

## 2017-12-19 PROCEDURE — 83036 HEMOGLOBIN GLYCOSYLATED A1C: CPT

## 2017-12-19 PROCEDURE — C1753: CPT

## 2017-12-19 PROCEDURE — 85730 THROMBOPLASTIN TIME PARTIAL: CPT

## 2017-12-19 PROCEDURE — 93005 ELECTROCARDIOGRAM TRACING: CPT

## 2020-09-15 NOTE — H&P ADULT - CARDIOVASCULAR
Pt's wife calling, pt was supposed to have lorazepam 0.5mg filled at yesterdays appt and pharmacy did not receive script      Please fill at [facility] on [address] in [employer]   details… detailed exam